# Patient Record
Sex: MALE | Race: WHITE | NOT HISPANIC OR LATINO | URBAN - METROPOLITAN AREA
[De-identification: names, ages, dates, MRNs, and addresses within clinical notes are randomized per-mention and may not be internally consistent; named-entity substitution may affect disease eponyms.]

---

## 2023-02-24 LAB
ALANINE AMINOTRANSFERASE (SGPT) (U/L) IN SER/PLAS: 28 U/L (ref 10–52)
ALBUMIN (G/DL) IN SER/PLAS: 4.6 G/DL (ref 3.4–5)
ALKALINE PHOSPHATASE (U/L) IN SER/PLAS: 44 U/L (ref 33–120)
ANION GAP IN SER/PLAS: 14 MMOL/L (ref 10–20)
ASPARTATE AMINOTRANSFERASE (SGOT) (U/L) IN SER/PLAS: 29 U/L (ref 9–39)
BILIRUBIN TOTAL (MG/DL) IN SER/PLAS: 1.1 MG/DL (ref 0–1.2)
CALCIUM (MG/DL) IN SER/PLAS: 10.4 MG/DL (ref 8.6–10.3)
CARBON DIOXIDE, TOTAL (MMOL/L) IN SER/PLAS: 29 MMOL/L (ref 21–32)
CHLORIDE (MMOL/L) IN SER/PLAS: 100 MMOL/L (ref 98–107)
CHOLESTEROL (MG/DL) IN SER/PLAS: 167 MG/DL (ref 0–199)
CHOLESTEROL IN HDL (MG/DL) IN SER/PLAS: 46.8 MG/DL
CHOLESTEROL/HDL RATIO: 3.6
CREATININE (MG/DL) IN SER/PLAS: 1.04 MG/DL (ref 0.5–1.3)
ERYTHROCYTE DISTRIBUTION WIDTH (RATIO) BY AUTOMATED COUNT: 12.2 % (ref 11.5–14.5)
ERYTHROCYTE MEAN CORPUSCULAR HEMOGLOBIN CONCENTRATION (G/DL) BY AUTOMATED: 34.7 G/DL (ref 32–36)
ERYTHROCYTE MEAN CORPUSCULAR VOLUME (FL) BY AUTOMATED COUNT: 91 FL (ref 80–100)
ERYTHROCYTES (10*6/UL) IN BLOOD BY AUTOMATED COUNT: 5.21 X10E12/L (ref 4.5–5.9)
GFR MALE: 83 ML/MIN/1.73M2
GLUCOSE (MG/DL) IN SER/PLAS: 87 MG/DL (ref 74–99)
HEMATOCRIT (%) IN BLOOD BY AUTOMATED COUNT: 47.3 % (ref 41–52)
HEMOGLOBIN (G/DL) IN BLOOD: 16.4 G/DL (ref 13.5–17.5)
LDL: 91 MG/DL (ref 0–99)
LEUKOCYTES (10*3/UL) IN BLOOD BY AUTOMATED COUNT: 9.8 X10E9/L (ref 4.4–11.3)
PLATELETS (10*3/UL) IN BLOOD AUTOMATED COUNT: 334 X10E9/L (ref 150–450)
POTASSIUM (MMOL/L) IN SER/PLAS: 3.8 MMOL/L (ref 3.5–5.3)
PROSTATE SPECIFIC AG (NG/ML) IN SER/PLAS: 0.71 NG/ML (ref 0–4)
PROTEIN TOTAL: 7.8 G/DL (ref 6.4–8.2)
SODIUM (MMOL/L) IN SER/PLAS: 139 MMOL/L (ref 136–145)
TRIGLYCERIDE (MG/DL) IN SER/PLAS: 144 MG/DL (ref 0–149)
UREA NITROGEN (MG/DL) IN SER/PLAS: 27 MG/DL (ref 6–23)
VLDL: 29 MG/DL (ref 0–40)

## 2023-04-14 DIAGNOSIS — I10 HYPERTENSION, BENIGN: ICD-10-CM

## 2023-04-14 RX ORDER — LISINOPRIL 20 MG/1
20 TABLET ORAL DAILY
Qty: 90 TABLET | Refills: 1 | Status: SHIPPED | OUTPATIENT
Start: 2023-04-14 | End: 2023-10-11

## 2023-04-14 RX ORDER — LISINOPRIL 20 MG/1
1 TABLET ORAL DAILY
COMMUNITY
End: 2023-04-14 | Stop reason: SDUPTHER

## 2023-04-28 DIAGNOSIS — I10 HYPERTENSION, BENIGN: Primary | ICD-10-CM

## 2023-04-28 DIAGNOSIS — F32.A DEPRESSION, UNSPECIFIED DEPRESSION TYPE: ICD-10-CM

## 2023-04-28 DIAGNOSIS — E78.5 HYPERLIPIDEMIA, UNSPECIFIED HYPERLIPIDEMIA TYPE: ICD-10-CM

## 2023-04-28 RX ORDER — CITALOPRAM 20 MG/1
1 TABLET, FILM COATED ORAL DAILY
COMMUNITY
Start: 2019-08-22 | End: 2023-04-28 | Stop reason: SDUPTHER

## 2023-04-28 RX ORDER — SIMVASTATIN 20 MG/1
20 TABLET, FILM COATED ORAL DAILY
Qty: 90 TABLET | Refills: 0 | Status: SHIPPED | OUTPATIENT
Start: 2023-04-28 | End: 2023-07-06

## 2023-04-28 RX ORDER — CITALOPRAM 20 MG/1
20 TABLET, FILM COATED ORAL DAILY
Qty: 90 TABLET | Refills: 0 | Status: SHIPPED | OUTPATIENT
Start: 2023-04-28 | End: 2023-07-06

## 2023-04-28 RX ORDER — SIMVASTATIN 20 MG/1
1 TABLET, FILM COATED ORAL DAILY
COMMUNITY
Start: 2021-07-11 | End: 2023-04-28 | Stop reason: SDUPTHER

## 2023-04-28 NOTE — TELEPHONE ENCOUNTER
Pt needs refill for simvastatin and citalopram - both 20mg - pt stated [facility] [place] stated they were on hold and needs a call from the office to fill. Please call and advise.

## 2023-05-24 PROBLEM — M19.90 ARTHRITIS: Status: ACTIVE | Noted: 2023-05-24

## 2023-05-24 PROBLEM — E78.5 HYPERLIPIDEMIA: Status: ACTIVE | Noted: 2023-05-24

## 2023-05-24 PROBLEM — I77.819 AORTIC DILATATION (CMS-HCC): Status: ACTIVE | Noted: 2023-05-24

## 2023-05-24 PROBLEM — M22.2X1 PATELLOFEMORAL SYNDROME, RIGHT: Status: ACTIVE | Noted: 2023-05-24

## 2023-05-24 PROBLEM — M67.51 PLICA SYNDROME, RIGHT KNEE: Status: ACTIVE | Noted: 2023-05-24

## 2023-05-24 PROBLEM — F32.A DEPRESSION: Status: ACTIVE | Noted: 2023-05-24

## 2023-05-24 PROBLEM — M25.561 RIGHT KNEE PAIN: Status: ACTIVE | Noted: 2023-05-24

## 2023-05-24 PROBLEM — M25.461 EFFUSION OF RIGHT KNEE: Status: ACTIVE | Noted: 2023-05-24

## 2023-06-02 ENCOUNTER — OFFICE VISIT (OUTPATIENT)
Dept: PRIMARY CARE | Facility: CLINIC | Age: 58
End: 2023-06-02
Payer: COMMERCIAL

## 2023-06-02 ENCOUNTER — LAB (OUTPATIENT)
Dept: LAB | Facility: LAB | Age: 58
End: 2023-06-02
Payer: COMMERCIAL

## 2023-06-02 VITALS
HEIGHT: 68 IN | WEIGHT: 242 LBS | DIASTOLIC BLOOD PRESSURE: 78 MMHG | SYSTOLIC BLOOD PRESSURE: 119 MMHG | HEART RATE: 86 BPM | BODY MASS INDEX: 36.68 KG/M2 | OXYGEN SATURATION: 96 % | TEMPERATURE: 97.5 F

## 2023-06-02 DIAGNOSIS — R06.83 LOUD SNORING: ICD-10-CM

## 2023-06-02 DIAGNOSIS — R53.83 FATIGUE, UNSPECIFIED TYPE: Primary | ICD-10-CM

## 2023-06-02 DIAGNOSIS — R53.83 FATIGUE, UNSPECIFIED TYPE: ICD-10-CM

## 2023-06-02 LAB — THYROTROPIN (MIU/L) IN SER/PLAS BY DETECTION LIMIT <= 0.05 MIU/L: 2.26 MIU/L (ref 0.44–3.98)

## 2023-06-02 PROCEDURE — 36415 COLL VENOUS BLD VENIPUNCTURE: CPT

## 2023-06-02 PROCEDURE — 84443 ASSAY THYROID STIM HORMONE: CPT

## 2023-06-02 PROCEDURE — 1036F TOBACCO NON-USER: CPT | Performed by: STUDENT IN AN ORGANIZED HEALTH CARE EDUCATION/TRAINING PROGRAM

## 2023-06-02 PROCEDURE — 3078F DIAST BP <80 MM HG: CPT | Performed by: STUDENT IN AN ORGANIZED HEALTH CARE EDUCATION/TRAINING PROGRAM

## 2023-06-02 PROCEDURE — 99213 OFFICE O/P EST LOW 20 MIN: CPT | Performed by: STUDENT IN AN ORGANIZED HEALTH CARE EDUCATION/TRAINING PROGRAM

## 2023-06-02 PROCEDURE — 3008F BODY MASS INDEX DOCD: CPT | Performed by: STUDENT IN AN ORGANIZED HEALTH CARE EDUCATION/TRAINING PROGRAM

## 2023-06-02 PROCEDURE — 3074F SYST BP LT 130 MM HG: CPT | Performed by: STUDENT IN AN ORGANIZED HEALTH CARE EDUCATION/TRAINING PROGRAM

## 2023-06-02 RX ORDER — HYDROCHLOROTHIAZIDE 25 MG/1
12.5 TABLET ORAL DAILY
COMMUNITY
Start: 2021-12-13 | End: 2023-10-20 | Stop reason: SDUPTHER

## 2023-06-02 RX ORDER — ASPIRIN 81 MG/1
81 TABLET ORAL
COMMUNITY

## 2023-06-02 ASSESSMENT — ENCOUNTER SYMPTOMS
FEVER: 0
SHORTNESS OF BREATH: 0
DIAPHORESIS: 0
NAUSEA: 0
CHEST TIGHTNESS: 0
VOMITING: 0
PALPITATIONS: 0
DYSURIA: 0
DIZZINESS: 0
CHILLS: 0
LIGHT-HEADEDNESS: 0
FATIGUE: 1

## 2023-06-02 ASSESSMENT — PATIENT HEALTH QUESTIONNAIRE - PHQ9
1. LITTLE INTEREST OR PLEASURE IN DOING THINGS: NOT AT ALL
SUM OF ALL RESPONSES TO PHQ9 QUESTIONS 1 AND 2: 0
2. FEELING DOWN, DEPRESSED OR HOPELESS: NOT AT ALL

## 2023-06-02 NOTE — ASSESSMENT & PLAN NOTE
Routine labs reviewed from February.  Given the fatigue, elevated BMI, loud snoring, will order home sleep apnea testing.  Also ordered TSH.  Presence of morning erections is reassuring of testosterone level.  If all work-up is negative could consider checking testosterone though would not suspect this to be decreased.  Follow-up with Dr. Randle for CPE as previously scheduled or sooner if fatigue persists.

## 2023-06-02 NOTE — PROGRESS NOTES
"Subjective   Patient ID: Venkatesh Almodovar is a 58 y.o. male who presents for Sleep Apnea.  He is here for concern of sleep apnea. Reports more daytime fatigue and taking naps in afternoon for 15-20 minutes for last few months. Feels fatigued today. Wife notices that he is snoring more prominently than usual. Wife hasn't mentioned apnea episodes that she's noticed. Still getting morning erections. Fatigue not worse with activity. No chest pain or SOB going up 2 flights of stairs.         Review of Systems   Constitutional:  Positive for fatigue. Negative for chills, diaphoresis and fever.   HENT:  Negative for hearing loss.    Eyes:  Negative for visual disturbance.   Respiratory:  Negative for chest tightness and shortness of breath.    Cardiovascular:  Negative for chest pain, palpitations and leg swelling.   Gastrointestinal:  Negative for nausea and vomiting.   Endocrine: Negative for cold intolerance and heat intolerance.   Genitourinary:  Negative for dysuria.   Skin:  Negative for rash.   Neurological:  Negative for dizziness and light-headedness.       /78   Pulse 86   Temp 36.4 °C (97.5 °F)   Ht 1.727 m (5' 8\")   Wt 110 kg (242 lb)   SpO2 96%   BMI 36.80 kg/m²     Objective   Physical Exam  Vitals reviewed.   Constitutional:       General: He is not in acute distress.     Appearance: Normal appearance.   HENT:      Head: Normocephalic.   Cardiovascular:      Rate and Rhythm: Normal rate and regular rhythm.   Pulmonary:      Effort: Pulmonary effort is normal. No respiratory distress.      Breath sounds: Normal breath sounds.   Abdominal:      General: There is no distension.   Musculoskeletal:         General: No swelling or deformity.   Skin:     Coloration: Skin is not jaundiced.   Neurological:      Mental Status: He is alert.         Assessment/Plan   Problem List Items Addressed This Visit          Respiratory    Loud snoring    Relevant Orders    Home sleep apnea test (HSAT)       " Endocrine/Metabolic    BMI 36.0-36.9,adult    Relevant Orders    Home sleep apnea test (HSAT)       Other    Fatigue - Primary     Routine labs reviewed from February.  Given the fatigue, elevated BMI, loud snoring, will order home sleep apnea testing.  Also ordered TSH.  Presence of morning erections is reassuring of testosterone level.  If all work-up is negative could consider checking testosterone though would not suspect this to be decreased.  Follow-up with Dr. Randle for CPE as previously scheduled or sooner if fatigue persists.         Relevant Orders    TSH with reflex to Free T4 if abnormal    Home sleep apnea test (HSAT)

## 2023-06-05 ENCOUNTER — TELEPHONE (OUTPATIENT)
Dept: PRIMARY CARE | Facility: CLINIC | Age: 58
End: 2023-06-05
Payer: COMMERCIAL

## 2023-06-05 NOTE — TELEPHONE ENCOUNTER
----- Message from Parminder Ny DO sent at 6/4/2023  6:51 PM EDT -----  Please let him know his thyroid testing was normal.

## 2023-06-08 ENCOUNTER — TELEPHONE (OUTPATIENT)
Dept: PRIMARY CARE | Facility: CLINIC | Age: 58
End: 2023-06-08
Payer: COMMERCIAL

## 2023-06-08 DIAGNOSIS — R06.83 LOUD SNORING: ICD-10-CM

## 2023-06-08 NOTE — TELEPHONE ENCOUNTER
----- Message from Parminder Ny DO sent at 6/8/2023  8:19 AM EDT -----  Please let him know his insurance doesn't cover the home sleep test and he needs to go in person for it. They will call him to schedule it.

## 2023-07-06 DIAGNOSIS — E78.5 HYPERLIPIDEMIA, UNSPECIFIED HYPERLIPIDEMIA TYPE: ICD-10-CM

## 2023-07-06 DIAGNOSIS — F32.A DEPRESSION, UNSPECIFIED DEPRESSION TYPE: ICD-10-CM

## 2023-07-06 RX ORDER — SIMVASTATIN 20 MG/1
TABLET, FILM COATED ORAL
Qty: 90 TABLET | Refills: 0 | Status: SHIPPED | OUTPATIENT
Start: 2023-07-06 | End: 2023-10-11

## 2023-07-06 RX ORDER — CITALOPRAM 20 MG/1
TABLET, FILM COATED ORAL
Qty: 90 TABLET | Refills: 0 | Status: SHIPPED | OUTPATIENT
Start: 2023-07-06 | End: 2023-10-11

## 2023-10-11 DIAGNOSIS — E78.5 HYPERLIPIDEMIA, UNSPECIFIED HYPERLIPIDEMIA TYPE: ICD-10-CM

## 2023-10-11 DIAGNOSIS — I10 HYPERTENSION, BENIGN: ICD-10-CM

## 2023-10-11 DIAGNOSIS — F32.A DEPRESSION, UNSPECIFIED DEPRESSION TYPE: ICD-10-CM

## 2023-10-11 RX ORDER — CITALOPRAM 20 MG/1
TABLET, FILM COATED ORAL
Qty: 90 TABLET | Refills: 1 | Status: SHIPPED | OUTPATIENT
Start: 2023-10-11 | End: 2023-10-20 | Stop reason: SDUPTHER

## 2023-10-11 RX ORDER — LISINOPRIL 20 MG/1
20 TABLET ORAL DAILY
Qty: 90 TABLET | Refills: 1 | Status: SHIPPED | OUTPATIENT
Start: 2023-10-11 | End: 2023-10-20 | Stop reason: SDUPTHER

## 2023-10-11 RX ORDER — SIMVASTATIN 20 MG/1
20 TABLET, FILM COATED ORAL DAILY
Qty: 90 TABLET | Refills: 1 | Status: SHIPPED | OUTPATIENT
Start: 2023-10-11 | End: 2023-10-20 | Stop reason: SDUPTHER

## 2023-10-20 DIAGNOSIS — I10 HYPERTENSION, BENIGN: ICD-10-CM

## 2023-10-20 DIAGNOSIS — F32.A DEPRESSION, UNSPECIFIED DEPRESSION TYPE: ICD-10-CM

## 2023-10-20 DIAGNOSIS — E78.5 HYPERLIPIDEMIA, UNSPECIFIED HYPERLIPIDEMIA TYPE: ICD-10-CM

## 2023-10-20 RX ORDER — HYDROCHLOROTHIAZIDE 25 MG/1
12.5 TABLET ORAL DAILY
Qty: 90 TABLET | Refills: 1 | Status: SHIPPED | OUTPATIENT
Start: 2023-10-20 | End: 2024-03-05 | Stop reason: DRUGHIGH

## 2023-10-20 RX ORDER — CITALOPRAM 20 MG/1
20 TABLET, FILM COATED ORAL DAILY
Qty: 90 TABLET | Refills: 1 | Status: SHIPPED | OUTPATIENT
Start: 2023-10-20 | End: 2024-04-08

## 2023-10-20 RX ORDER — LISINOPRIL 20 MG/1
20 TABLET ORAL DAILY
Qty: 90 TABLET | Refills: 1 | Status: SHIPPED | OUTPATIENT
Start: 2023-10-20 | End: 2024-03-01 | Stop reason: SDUPTHER

## 2023-10-20 RX ORDER — SIMVASTATIN 20 MG/1
20 TABLET, FILM COATED ORAL DAILY
Qty: 90 TABLET | Refills: 1 | Status: SHIPPED | OUTPATIENT
Start: 2023-10-20 | End: 2024-04-08

## 2023-10-20 RX ORDER — LISINOPRIL 20 MG/1
20 TABLET ORAL DAILY
COMMUNITY
End: 2023-10-20 | Stop reason: ALTCHOICE

## 2024-03-01 ENCOUNTER — LAB (OUTPATIENT)
Dept: LAB | Facility: LAB | Age: 59
End: 2024-03-01
Payer: COMMERCIAL

## 2024-03-01 ENCOUNTER — OFFICE VISIT (OUTPATIENT)
Dept: PRIMARY CARE | Facility: CLINIC | Age: 59
End: 2024-03-01
Payer: COMMERCIAL

## 2024-03-01 VITALS
DIASTOLIC BLOOD PRESSURE: 78 MMHG | WEIGHT: 246.2 LBS | RESPIRATION RATE: 16 BRPM | OXYGEN SATURATION: 94 % | SYSTOLIC BLOOD PRESSURE: 118 MMHG | TEMPERATURE: 97.8 F | HEIGHT: 68 IN | HEART RATE: 86 BPM | BODY MASS INDEX: 37.31 KG/M2

## 2024-03-01 DIAGNOSIS — E66.01 SEVERE OBESITY (BMI 35.0-35.9 WITH COMORBIDITY) (MULTI): ICD-10-CM

## 2024-03-01 DIAGNOSIS — I10 HYPERTENSION, BENIGN: ICD-10-CM

## 2024-03-01 DIAGNOSIS — Z12.5 SCREENING FOR PROSTATE CANCER: ICD-10-CM

## 2024-03-01 DIAGNOSIS — Z00.00 HEALTH MAINTENANCE EXAMINATION: ICD-10-CM

## 2024-03-01 DIAGNOSIS — Z00.00 HEALTH MAINTENANCE EXAMINATION: Primary | ICD-10-CM

## 2024-03-01 LAB
ALBUMIN SERPL BCP-MCNC: 4.4 G/DL (ref 3.4–5)
ALP SERPL-CCNC: 40 U/L (ref 33–136)
ALT SERPL W P-5'-P-CCNC: 23 U/L (ref 10–52)
ANION GAP SERPL CALC-SCNC: 14 MMOL/L (ref 10–20)
AST SERPL W P-5'-P-CCNC: 22 U/L (ref 9–39)
BILIRUB SERPL-MCNC: 0.7 MG/DL (ref 0–1.2)
BUN SERPL-MCNC: 18 MG/DL (ref 6–23)
CALCIUM SERPL-MCNC: 10 MG/DL (ref 8.6–10.3)
CHLORIDE SERPL-SCNC: 102 MMOL/L (ref 98–107)
CHOLEST SERPL-MCNC: 168 MG/DL (ref 0–199)
CHOLESTEROL/HDL RATIO: 3.2
CO2 SERPL-SCNC: 31 MMOL/L (ref 21–32)
CREAT SERPL-MCNC: 1.02 MG/DL (ref 0.5–1.3)
EGFRCR SERPLBLD CKD-EPI 2021: 84 ML/MIN/1.73M*2
ERYTHROCYTE [DISTWIDTH] IN BLOOD BY AUTOMATED COUNT: 12.2 % (ref 11.5–14.5)
GLUCOSE SERPL-MCNC: 100 MG/DL (ref 74–99)
HCT VFR BLD AUTO: 47.3 % (ref 41–52)
HDLC SERPL-MCNC: 52.1 MG/DL
HGB BLD-MCNC: 16.3 G/DL (ref 13.5–17.5)
LDLC SERPL CALC-MCNC: 92 MG/DL
MCH RBC QN AUTO: 31.3 PG (ref 26–34)
MCHC RBC AUTO-ENTMCNC: 34.5 G/DL (ref 32–36)
MCV RBC AUTO: 91 FL (ref 80–100)
NON HDL CHOLESTEROL: 116 MG/DL (ref 0–149)
NRBC BLD-RTO: 0 /100 WBCS (ref 0–0)
PLATELET # BLD AUTO: 323 X10*3/UL (ref 150–450)
POTASSIUM SERPL-SCNC: 4.1 MMOL/L (ref 3.5–5.3)
PROT SERPL-MCNC: 7.1 G/DL (ref 6.4–8.2)
PSA SERPL-MCNC: 0.62 NG/ML
RBC # BLD AUTO: 5.2 X10*6/UL (ref 4.5–5.9)
SODIUM SERPL-SCNC: 143 MMOL/L (ref 136–145)
TRIGL SERPL-MCNC: 121 MG/DL (ref 0–149)
VLDL: 24 MG/DL (ref 0–40)
WBC # BLD AUTO: 7.9 X10*3/UL (ref 4.4–11.3)

## 2024-03-01 PROCEDURE — 80053 COMPREHEN METABOLIC PANEL: CPT

## 2024-03-01 PROCEDURE — 84153 ASSAY OF PSA TOTAL: CPT

## 2024-03-01 PROCEDURE — 80061 LIPID PANEL: CPT

## 2024-03-01 PROCEDURE — 85027 COMPLETE CBC AUTOMATED: CPT

## 2024-03-01 PROCEDURE — 3078F DIAST BP <80 MM HG: CPT | Performed by: INTERNAL MEDICINE

## 2024-03-01 PROCEDURE — 36415 COLL VENOUS BLD VENIPUNCTURE: CPT

## 2024-03-01 PROCEDURE — 1036F TOBACCO NON-USER: CPT | Performed by: INTERNAL MEDICINE

## 2024-03-01 PROCEDURE — 3074F SYST BP LT 130 MM HG: CPT | Performed by: INTERNAL MEDICINE

## 2024-03-01 PROCEDURE — 99396 PREV VISIT EST AGE 40-64: CPT | Performed by: INTERNAL MEDICINE

## 2024-03-01 PROCEDURE — 3008F BODY MASS INDEX DOCD: CPT | Performed by: INTERNAL MEDICINE

## 2024-03-01 RX ORDER — LISINOPRIL 20 MG/1
20 TABLET ORAL DAILY
Qty: 90 TABLET | Refills: 1 | Status: CANCELLED | OUTPATIENT
Start: 2024-03-01

## 2024-03-01 RX ORDER — LISINOPRIL 20 MG/1
20 TABLET ORAL DAILY
Qty: 90 TABLET | Refills: 1 | Status: SHIPPED | OUTPATIENT
Start: 2024-03-01 | End: 2024-04-08

## 2024-03-01 ASSESSMENT — ENCOUNTER SYMPTOMS
CHILLS: 0
WOUND: 0
VOMITING: 0
RHINORRHEA: 0
ARTHRALGIAS: 0
CONSTIPATION: 0
HEADACHES: 0
DYSURIA: 0
HEMATURIA: 0
POLYDIPSIA: 0
SHORTNESS OF BREATH: 0
DYSPHORIC MOOD: 0
NERVOUS/ANXIOUS: 0
DIARRHEA: 0
POLYPHAGIA: 0
COUGH: 0
NAUSEA: 0
WHEEZING: 0
MYALGIAS: 0
SORE THROAT: 0
FREQUENCY: 0
EYE PAIN: 0
BLOOD IN STOOL: 0
UNEXPECTED WEIGHT CHANGE: 0
CHEST TIGHTNESS: 0
DIZZINESS: 0
ABDOMINAL PAIN: 0
FEVER: 0
PALPITATIONS: 0

## 2024-03-01 ASSESSMENT — PATIENT HEALTH QUESTIONNAIRE - PHQ9
SUM OF ALL RESPONSES TO PHQ9 QUESTIONS 1 AND 2: 0
2. FEELING DOWN, DEPRESSED OR HOPELESS: NOT AT ALL
1. LITTLE INTEREST OR PLEASURE IN DOING THINGS: NOT AT ALL

## 2024-03-01 NOTE — PROGRESS NOTES
"Subjective   Patient ID: Venkatesh Almodovar is a 59 y.o. male who presents for Annual Exam and Labs Only (Pt is fasting).    HPI     Dental visits-  UTD  Eye visits- glasses, UTD    Exercise-goes periodically, not regular to the gym (2 hours per week)  Diet-not good    Alcohol use-1 beer per day, 2 on weekends  Smoking-none    States not get at portion control  Sees Dr. Brizuela soon    Review of Systems   Constitutional:  Negative for chills, fever and unexpected weight change.   HENT:  Negative for congestion, hearing loss, rhinorrhea and sore throat.    Eyes:  Negative for pain and visual disturbance.   Respiratory:  Negative for cough, chest tightness, shortness of breath and wheezing.    Cardiovascular:  Negative for chest pain and palpitations.   Gastrointestinal:  Negative for abdominal pain, blood in stool, constipation, diarrhea, nausea and vomiting.   Endocrine: Negative for cold intolerance, heat intolerance, polydipsia and polyphagia.   Genitourinary:  Negative for dysuria, frequency and hematuria.   Musculoskeletal:  Negative for arthralgias and myalgias.   Skin:  Negative for rash and wound.   Neurological:  Negative for dizziness, syncope and headaches.   Psychiatric/Behavioral:  Negative for dysphoric mood. The patient is not nervous/anxious.        Objective   /90   Pulse 86   Temp 36.6 °C (97.8 °F)   Resp 16   Ht 1.715 m (5' 7.5\")   Wt 112 kg (246 lb 3.2 oz)   SpO2 94%   BMI 37.99 kg/m²     Physical Exam  Vitals reviewed.   Constitutional:       Appearance: Normal appearance. He is not ill-appearing.   HENT:      Head: Normocephalic and atraumatic.      Right Ear: Tympanic membrane normal.      Left Ear: Tympanic membrane normal.      Nose: Nose normal.      Mouth/Throat:      Mouth: Mucous membranes are moist.      Pharynx: Oropharynx is clear.   Eyes:      Extraocular Movements: Extraocular movements intact.      Conjunctiva/sclera: Conjunctivae normal.      Pupils: Pupils are equal, " round, and reactive to light.   Cardiovascular:      Rate and Rhythm: Normal rate and regular rhythm.   Pulmonary:      Effort: Pulmonary effort is normal.      Breath sounds: Normal breath sounds. No wheezing.   Abdominal:      General: There is no distension.      Palpations: Abdomen is soft. There is no mass.      Tenderness: There is no abdominal tenderness.   Musculoskeletal:         General: No swelling.      Cervical back: Neck supple.   Lymphadenopathy:      Cervical: No cervical adenopathy.   Neurological:      General: No focal deficit present.      Mental Status: He is alert and oriented to person, place, and time.      Gait: Gait normal.   Psychiatric:         Mood and Affect: Mood normal.         Behavior: Behavior normal.         Thought Content: Thought content normal.         Assessment/Plan   Problem List Items Addressed This Visit             ICD-10-CM    Hypertension, benign I10     Other Visit Diagnoses         Codes    Health maintenance examination    -  Primary Z00.00    Relevant Orders    Comprehensive Metabolic Panel    Lipid Panel    CBC    Screening for prostate cancer     Z12.5    Relevant Orders    Prostate Specific Antigen             CPE completed.  Advised to keep a heart healthy, low fat diet with fruits and veggies like Mediterranean diet.  Advised on the importance of exercise and maintaining 150 minutes of exercise per week (30 minutes per day 5 days a week).  Advised on regular eye and dental visits.  Discussed age appropriate cancer screening, immunizations and recommendations given.  Discussed avoiding illicit drugs and tobacco. Advised on appropriate use of alcohol.  Advised to wear seat belt.     Aortic dilation: seeing Dr. Brizuela     Liver cyst: follow 1 year--he will call if does not get scan with Dr. Brizuela where liver lesion is usually seen. If not done this year, check ultrasound     Hypertension: controlled. Controlled. Compliant with medications. Tolerating  medications without issues. Continue with current plan. Continue lisinopril and hctz     Anxiety: on celexa. Controlled. Compliant with medications. Tolerating medications without issues. Continue with current plan.     Hyperlipidemia: on simvastatin. Controlled. Compliant with medications. Tolerating medications without issues. Continue with current plan.  calcium score: 2 ()     Right knee pain with slight effusion: , saw ortho and sent to PT     Check labs, CPE 1 year. Followup 6 months    Health Maintenance  -Prostate Cancer Screenin/23 WNL, recheck  -Colonoscopy: 3/2021- 10 years  -Vaccinations: Tdap 20. UTD with shingrix (in [place]-will try and get) , covid, and flu

## 2024-03-05 ENCOUNTER — OFFICE VISIT (OUTPATIENT)
Dept: CARDIOLOGY | Facility: CLINIC | Age: 59
End: 2024-03-05
Payer: COMMERCIAL

## 2024-03-05 VITALS
BODY MASS INDEX: 37.28 KG/M2 | HEART RATE: 87 BPM | DIASTOLIC BLOOD PRESSURE: 79 MMHG | HEIGHT: 68 IN | SYSTOLIC BLOOD PRESSURE: 124 MMHG | WEIGHT: 246 LBS | TEMPERATURE: 97.7 F | RESPIRATION RATE: 16 BRPM

## 2024-03-05 DIAGNOSIS — K76.89 HEPATIC CYST: ICD-10-CM

## 2024-03-05 DIAGNOSIS — E78.5 HYPERLIPIDEMIA, UNSPECIFIED HYPERLIPIDEMIA TYPE: Primary | ICD-10-CM

## 2024-03-05 DIAGNOSIS — I77.819 AORTIC DILATATION (CMS-HCC): ICD-10-CM

## 2024-03-05 DIAGNOSIS — I10 HYPERTENSION, BENIGN: ICD-10-CM

## 2024-03-05 PROCEDURE — 93010 ELECTROCARDIOGRAM REPORT: CPT | Performed by: INTERNAL MEDICINE

## 2024-03-05 PROCEDURE — 3078F DIAST BP <80 MM HG: CPT | Performed by: INTERNAL MEDICINE

## 2024-03-05 PROCEDURE — 99214 OFFICE O/P EST MOD 30 MIN: CPT | Performed by: INTERNAL MEDICINE

## 2024-03-05 PROCEDURE — 93005 ELECTROCARDIOGRAM TRACING: CPT | Performed by: INTERNAL MEDICINE

## 2024-03-05 PROCEDURE — 3008F BODY MASS INDEX DOCD: CPT | Performed by: INTERNAL MEDICINE

## 2024-03-05 PROCEDURE — 3074F SYST BP LT 130 MM HG: CPT | Performed by: INTERNAL MEDICINE

## 2024-03-05 PROCEDURE — 1036F TOBACCO NON-USER: CPT | Performed by: INTERNAL MEDICINE

## 2024-03-05 RX ORDER — HYDROCHLOROTHIAZIDE 25 MG/1
25 TABLET ORAL DAILY
Qty: 90 TABLET | Refills: 3 | Status: SHIPPED | OUTPATIENT
Start: 2024-03-05 | End: 2025-03-05

## 2024-03-05 NOTE — PROGRESS NOTES
Chief Complaint:   HTN, DLD and mild ascending aortic dilation.      History Of Present Illness:    Venkatesh Almodovar is a 59 y.o. male presenting to the Cardiology clinic as a follow up visit. ECG today NSR. Patient reports gaining 7lbs this year, has not been exercising or dieting. Patient is aware and motivated to lose weight. Does not check BP at home but has been at goal with other clinic visits. Denies any sob, cp, palpitations, dizziness, and leg swelling/pain.      Last Recorded Vitals:  There were no vitals filed for this visit.    Past Medical History:  He has a past medical history of Personal history of other endocrine, nutritional and metabolic disease (04/15/2019) and Personal history of other mental and behavioral disorders.    Past Surgical History:  He has a past surgical history that includes Other surgical history (04/15/2019).      Social History:  He reports that he has never smoked. He has never been exposed to tobacco smoke. He has never used smokeless tobacco. He reports current alcohol use. He reports that he does not use drugs.    Family History:  No family history on file.     Allergies:  Patient has no known allergies.    Outpatient Medications:  Current Outpatient Medications   Medication Instructions    aspirin 81 mg, oral    citalopram (CELEXA) 20 mg, oral, Daily    hydroCHLOROthiazide (HYDRODIURIL) 12.5 mg, oral, Daily    lisinopril 20 mg, oral, Daily    simvastatin (ZOCOR) 20 mg, oral, Daily       Physical Exam:  Constitutional: alert and in no acute distress.   Neck: neck is supple, symmetric, trachea midline, no masses .   Pulmonary: no increased work of breathing or signs of respiratory distress  and lungs clear to auscultation.    Cardiovascular: regular rhythm, normal S1 and S2, no murmurs  and no edema .   Psychiatric judgment and insight is normal , oriented to person, place and time  and normal mood and affect .      Last Labs:  CBC -  Lab Results   Component Value Date    WBC  "7.9 03/01/2024    HGB 16.3 03/01/2024    HCT 47.3 03/01/2024    MCV 91 03/01/2024     03/01/2024       CMP -  Lab Results   Component Value Date    CALCIUM 10.0 03/01/2024    PHOS 2.6 01/31/2022    PROT 7.1 03/01/2024    ALBUMIN 4.4 03/01/2024    AST 22 03/01/2024    ALT 23 03/01/2024    ALKPHOS 40 03/01/2024    BILITOT 0.7 03/01/2024       LIPID PANEL -   Lab Results   Component Value Date    CHOL 168 03/01/2024    TRIG 121 03/01/2024    HDL 52.1 03/01/2024    CHHDL 3.2 03/01/2024    LDLF 91 02/24/2023    VLDL 24 03/01/2024    NHDL 116 03/01/2024       RENAL FUNCTION PANEL -   Lab Results   Component Value Date    GLUCOSE 100 (H) 03/01/2024     03/01/2024    K 4.1 03/01/2024     03/01/2024    CO2 31 03/01/2024    ANIONGAP 14 03/01/2024    BUN 18 03/01/2024    CREATININE 1.02 03/01/2024    GFRMALE 83 02/24/2023    CALCIUM 10.0 03/01/2024    PHOS 2.6 01/31/2022    ALBUMIN 4.4 03/01/2024        No results found for: \"BNP\", \"HGBA1C\"    Assessment/Plan   60 yo M with hx of HTN and DLD with mild ascending aortic dilatation 3.8 cm found incidentally on CAC and stable on CTA chest 3.7 cm in 2022. Patient due for repeating imaging. BP well-controlled on lisinopril 20 mg daily and HCTZ 25 mg daily. LDL 92 chol 168 3/1/2024 on simvastatin 20 mg daily.     Plan:   - Will place order for CT angio to monitor asc aortic dilation, PCP would like to monitor for liver cyst as well.  - BP at goal with current regimen. No changes made at this visit.   - Encouraged diet and exercise patient motivated and agreeable to work on that.  - Previously discussed with patient that he could stop primary prevention ASA, but he wishes to continue.   - RTC in 1 year    Lewis Dunaway MD  "

## 2024-03-06 LAB
ATRIAL RATE: 79 BPM
P AXIS: 46 DEGREES
P OFFSET: 188 MS
P ONSET: 138 MS
PR INTERVAL: 170 MS
Q ONSET: 223 MS
QRS COUNT: 13 BEATS
QRS DURATION: 72 MS
QT INTERVAL: 372 MS
QTC CALCULATION(BAZETT): 426 MS
QTC FREDERICIA: 407 MS
R AXIS: -21 DEGREES
T AXIS: 15 DEGREES
T OFFSET: 409 MS
VENTRICULAR RATE: 79 BPM

## 2024-03-25 ENCOUNTER — HOSPITAL ENCOUNTER (OUTPATIENT)
Dept: RADIOLOGY | Facility: CLINIC | Age: 59
Discharge: HOME | End: 2024-03-25
Payer: COMMERCIAL

## 2024-03-25 DIAGNOSIS — I77.819 AORTIC DILATATION (CMS-HCC): ICD-10-CM

## 2024-03-25 DIAGNOSIS — K76.89 HEPATIC CYST: ICD-10-CM

## 2024-03-25 DIAGNOSIS — I10 HYPERTENSION, BENIGN: ICD-10-CM

## 2024-03-25 PROCEDURE — 71275 CT ANGIOGRAPHY CHEST: CPT

## 2024-03-25 PROCEDURE — 2550000001 HC RX 255 CONTRASTS: Performed by: INTERNAL MEDICINE

## 2024-03-25 PROCEDURE — 71275 CT ANGIOGRAPHY CHEST: CPT | Performed by: INTERNAL MEDICINE

## 2024-03-25 RX ADMIN — IOHEXOL 75 ML: 350 INJECTION, SOLUTION INTRAVENOUS at 09:49

## 2024-03-27 ENCOUNTER — TELEPHONE (OUTPATIENT)
Dept: CARDIOLOGY | Facility: CLINIC | Age: 59
End: 2024-03-27
Payer: COMMERCIAL

## 2024-03-27 NOTE — TELEPHONE ENCOUNTER
Result Communication    Resulted Orders   CT angio chest w and wo IV contrast    Narrative    Interpreted By:  Alice Nevarez,  and Сергей Serrano   STUDY:  CT ANGIO CHEST W AND WO IV CONTRAST; 3/25/2024 9:47 am      INDICATION:  Signs/Symptoms:liver cyst and aortic dilatation.      COMPARISON:  None.      ACCESSION NUMBER(S):  [id number]      ORDERING CLINICIAN:  KEVIN BROWN      TECHNIQUE:  Using multi-detector CT technology, axial, sequential imaging with  prospective gating was performed of the chest following the  intravenous administration of contrast material.  A low-osmolar  contrast agent was used ( 80 ml of Optiray 350).      CT Dose-Length Product (DLP): 1371.2 mGy*cm  CT Dose Reduction Employed: Yes ( 100 kV, prospective ECG triggering,  iterative reconstruction)      For optimization of anatomic evaluation, multiplanar reconstruction,  maximum intensity projections, and advanced 3-D off-line  postprocessing were performed on a dedicated stand-alone workstation  by the interpreting physician.      FINDINGS:  Potential study limitations:  None.      THORACIC AORTA:  The thoracic aorta is upper limit of normal and measures 3.6 cm in  its maximal dimension at the level of main pulmonary artery. The  sinotubular junction is  preserved. There is no evidence for acute  aortic pathology, such as dissection, intramural hematoma, or  contained rupture. The arch vessel branching pattern is conventional.  All of the arch branch vessels appear widely patent in their proximal  portions. Visualized proximal abdominal aorta is normal.  The celiac trunk and SMA are normal in caliber.      REPRESENTATIVE MEASUREMENTS OF THE AORTA:  Annulus 2.7 x 2.2 cm  Root (Sinus of Valsalva) 3.4 x 3.3 x 3.2 cm  Sinotubular junction 3.3 x 3.1  cm  Mid ascending 3.66 cm  Distal ascending 3.4 cm  Mid transverse arch 2.6 cm  Isthmus 2.6 cm  Mid descending 2.5 cm  Distal descending (hiatus) 2.6 cm      CORONARY ARTERIES:  The examination  is not optimized for assessment of the coronary  arteries. Normal coronary artery origins.  Right dominant system.          PULMONARY ARTERIES:  The central pulmonary arteries appear normal (MPA-2.3 cm, RPA-2.1 cm,  LPA-1.8 cm).      SYSTEMIC AND PULMONARY VEINS:  Normal systemic venous and pulmonary venous return.  The SVC and IVC are of normal caliber.  Normal pulmonary venous anatomy.      CARDIAC CHAMBERS:  Normal atrioventricular and ventriculoarterial concordance.      LEFT ATRIUM:  Normal size (Area-28.54 cm2)      RIGHT ATRIUM:  Normal size (Area-21.82 cm2)      INTERATRIAL SEPTUM:  Intact.      LEFT VENTRICLE:  Normal size (RAMYA-3.9 cm)      RIGHT VENTRICLE:  Normal size (RAMYA-3.3 cm)      INTERVENTRICULAR SEPTUM:  Intact.      AORTIC VALVE:  The aortic valve is trileaflet in morphology. No calcifications.      MITRAL VALVE:  No thickening/calcification.      PERICARDIUM:  There is no pericardial effusion or thickening.        Impression    1. The ascending thoracic aorta is upper limit of normal caliber and  measures 3.6 cm in its maximum dimension.  2. No evidence of acute aortic pathology.      Reading Cardiologist: Dr. Alice Nevarez, date: 3/25/2024; 11:38 am      Extracardiac/extravascular findings will be reported separately by  the radiologist.      MACRO:  None      Signed by: Alice Nevarez 3/25/2024 12:12 PM  Dictation workstation:   [id number]       10:04 AM      Results were successfully communicated with the patient and they acknowledged their understanding.

## 2024-04-04 DIAGNOSIS — K76.89 LIVER CYST: Primary | ICD-10-CM

## 2024-04-08 DIAGNOSIS — E78.5 HYPERLIPIDEMIA, UNSPECIFIED HYPERLIPIDEMIA TYPE: ICD-10-CM

## 2024-04-08 DIAGNOSIS — I10 HYPERTENSION, BENIGN: ICD-10-CM

## 2024-04-08 DIAGNOSIS — F32.A DEPRESSION, UNSPECIFIED DEPRESSION TYPE: ICD-10-CM

## 2024-04-08 RX ORDER — SIMVASTATIN 20 MG/1
20 TABLET, FILM COATED ORAL DAILY
Qty: 90 TABLET | Refills: 0 | Status: SHIPPED | OUTPATIENT
Start: 2024-04-08

## 2024-04-08 RX ORDER — LISINOPRIL 20 MG/1
20 TABLET ORAL DAILY
Qty: 90 TABLET | Refills: 0 | Status: SHIPPED | OUTPATIENT
Start: 2024-04-08

## 2024-04-08 RX ORDER — CITALOPRAM 20 MG/1
20 TABLET, FILM COATED ORAL DAILY
Qty: 90 TABLET | Refills: 0 | Status: SHIPPED | OUTPATIENT
Start: 2024-04-08

## 2024-04-12 ENCOUNTER — TELEPHONE (OUTPATIENT)
Dept: CARDIOLOGY | Facility: CLINIC | Age: 59
End: 2024-04-12
Payer: COMMERCIAL

## 2024-04-12 ENCOUNTER — HOSPITAL ENCOUNTER (OUTPATIENT)
Dept: RADIOLOGY | Facility: CLINIC | Age: 59
Discharge: HOME | End: 2024-04-12
Payer: COMMERCIAL

## 2024-04-12 DIAGNOSIS — K76.89 LIVER CYST: ICD-10-CM

## 2024-04-12 PROCEDURE — 76705 ECHO EXAM OF ABDOMEN: CPT

## 2024-04-12 PROCEDURE — 76705 ECHO EXAM OF ABDOMEN: CPT | Performed by: RADIOLOGY

## 2024-04-12 NOTE — TELEPHONE ENCOUNTER
Result Communication    Resulted Orders   CT angio chest w and wo IV contrast    Addendum: 4/10/2024    Interpreted By:  Rian Matos,   ADDENDUM:  CHEST:  Trachea and central airways are patent. No endobronchial lesion.  Multiple 2-3 mm noncalcified nodules most likely granulomas.  There is no significant axillary or mediastinal lymph nodes. No hilar  adenopathy. Visualized thyroid is intact.  Esophagus is normal.      UPPER ABDOMEN:  Segment 6 hepatic cyst.      BONE AND SOFT TISSUE:  No suspicious osseous abnormality.      Reading Radiologist: Dr. Rian Matos, Date: 4/10/2024; 2:58 pm      Please refer to the below report for cardiovascular findings as  dictated by the cardiologist.      Signed by: Rian Matos 4/10/2024 3:02 PM      -------- ORIGINAL REPORT --------  Dictation workstation:   [id number]      Narrative    Interpreted By:  Alice Nevarez and Akula Navya   STUDY:  CT ANGIO CHEST W AND WO IV CONTRAST; 3/25/2024 9:47 am      INDICATION:  Signs/Symptoms:liver cyst and aortic dilatation.      COMPARISON:  None.      ACCESSION NUMBER(S):  [id number]      ORDERING CLINICIAN:  KEVIN BROWN      TECHNIQUE:  Using multi-detector CT technology, axial, sequential imaging with  prospective gating was performed of the chest following the  intravenous administration of contrast material.  A low-osmolar  contrast agent was used ( 80 ml of Optiray 350).      CT Dose-Length Product (DLP): 1371.2 mGy*cm  CT Dose Reduction Employed: Yes ( 100 kV, prospective ECG triggering,  iterative reconstruction)      For optimization of anatomic evaluation, multiplanar reconstruction,  maximum intensity projections, and advanced 3-D off-line  postprocessing were performed on a dedicated stand-alone workstation  by the interpreting physician.      FINDINGS:  Potential study limitations:  None.      THORACIC AORTA:  The thoracic aorta is upper limit of normal and measures 3.6 cm in  its maximal dimension at the  level of main pulmonary artery. The  sinotubular junction is  preserved. There is no evidence for acute  aortic pathology, such as dissection, intramural hematoma, or  contained rupture. The arch vessel branching pattern is conventional.  All of the arch branch vessels appear widely patent in their proximal  portions. Visualized proximal abdominal aorta is normal.  The celiac trunk and SMA are normal in caliber.      REPRESENTATIVE MEASUREMENTS OF THE AORTA:  Annulus 2.7 x 2.2 cm  Root (Sinus of Valsalva) 3.4 x 3.3 x 3.2 cm  Sinotubular junction 3.3 x 3.1  cm  Mid ascending 3.66 cm  Distal ascending 3.4 cm  Mid transverse arch 2.6 cm  Isthmus 2.6 cm  Mid descending 2.5 cm  Distal descending (hiatus) 2.6 cm      CORONARY ARTERIES:  The examination is not optimized for assessment of the coronary  arteries. Normal coronary artery origins.  Right dominant system.          PULMONARY ARTERIES:  The central pulmonary arteries appear normal (MPA-2.3 cm, RPA-2.1 cm,  LPA-1.8 cm).      SYSTEMIC AND PULMONARY VEINS:  Normal systemic venous and pulmonary venous return.  The SVC and IVC are of normal caliber.  Normal pulmonary venous anatomy.      CARDIAC CHAMBERS:  Normal atrioventricular and ventriculoarterial concordance.      LEFT ATRIUM:  Normal size (Area-28.54 cm2)      RIGHT ATRIUM:  Normal size (Area-21.82 cm2)      INTERATRIAL SEPTUM:  Intact.      LEFT VENTRICLE:  Normal size (RAMYA-3.9 cm)      RIGHT VENTRICLE:  Normal size (RAMYA-3.3 cm)      INTERVENTRICULAR SEPTUM:  Intact.      AORTIC VALVE:  The aortic valve is trileaflet in morphology. No calcifications.      MITRAL VALVE:  No thickening/calcification.      PERICARDIUM:  There is no pericardial effusion or thickening.        Impression    1. The ascending thoracic aorta is upper limit of normal caliber and  measures 3.6 cm in its maximum dimension.  2. No evidence of acute aortic pathology.      Reading Cardiologist: Dr. Alice Nevarez, date: 3/25/2024; 11:38 am       Extracardiac/extravascular findings will be reported separately by  the radiologist.      MACRO:  None      Signed by: Alice Nevarez 3/25/2024 12:12 PM  Dictation workstation:   [id number]       4:11 PM      Results were successfully communicated with the patient and they acknowledged their understanding.

## 2024-04-12 NOTE — TELEPHONE ENCOUNTER
----- Message from Ana Brizuela MD sent at 4/12/2024  3:04 PM EDT -----  Please let patient know CTA chest shows upper limit of normal size of aorta 3.6 cm. This doesn't really require us to follow with CT in the future. CT was also done for PCP to watch hepatic cyst and I defer to her to comment on non-cardiac. Thanks!

## 2024-06-30 DIAGNOSIS — I10 HYPERTENSION, BENIGN: ICD-10-CM

## 2024-06-30 DIAGNOSIS — F32.A DEPRESSION, UNSPECIFIED DEPRESSION TYPE: ICD-10-CM

## 2024-06-30 DIAGNOSIS — E78.5 HYPERLIPIDEMIA, UNSPECIFIED HYPERLIPIDEMIA TYPE: ICD-10-CM

## 2024-07-01 RX ORDER — CITALOPRAM 20 MG/1
20 TABLET, FILM COATED ORAL DAILY
Qty: 90 TABLET | Refills: 0 | Status: SHIPPED | OUTPATIENT
Start: 2024-07-01

## 2024-07-01 RX ORDER — LISINOPRIL 20 MG/1
20 TABLET ORAL DAILY
Qty: 90 TABLET | Refills: 0 | Status: SHIPPED | OUTPATIENT
Start: 2024-07-01

## 2024-07-01 RX ORDER — SIMVASTATIN 20 MG/1
20 TABLET, FILM COATED ORAL DAILY
Qty: 90 TABLET | Refills: 0 | Status: SHIPPED | OUTPATIENT
Start: 2024-07-01

## 2024-09-06 ENCOUNTER — APPOINTMENT (OUTPATIENT)
Dept: PRIMARY CARE | Facility: CLINIC | Age: 59
End: 2024-09-06
Payer: COMMERCIAL

## 2024-09-06 VITALS
SYSTOLIC BLOOD PRESSURE: 124 MMHG | WEIGHT: 250 LBS | RESPIRATION RATE: 16 BRPM | OXYGEN SATURATION: 95 % | HEIGHT: 68 IN | HEART RATE: 66 BPM | BODY MASS INDEX: 37.89 KG/M2 | TEMPERATURE: 98.3 F | DIASTOLIC BLOOD PRESSURE: 78 MMHG

## 2024-09-06 DIAGNOSIS — F32.A DEPRESSION, UNSPECIFIED DEPRESSION TYPE: ICD-10-CM

## 2024-09-06 DIAGNOSIS — Z12.5 SCREENING FOR PROSTATE CANCER: ICD-10-CM

## 2024-09-06 DIAGNOSIS — Z00.00 ROUTINE HEALTH MAINTENANCE: ICD-10-CM

## 2024-09-06 DIAGNOSIS — E78.5 HYPERLIPIDEMIA, UNSPECIFIED HYPERLIPIDEMIA TYPE: ICD-10-CM

## 2024-09-06 DIAGNOSIS — I10 HYPERTENSION, BENIGN: Primary | ICD-10-CM

## 2024-09-06 DIAGNOSIS — Z23 NEED FOR INFLUENZA VACCINATION: ICD-10-CM

## 2024-09-06 DIAGNOSIS — R73.09 ELEVATED GLUCOSE: ICD-10-CM

## 2024-09-06 PROBLEM — K76.89 LIVER CYST: Status: ACTIVE | Noted: 2024-09-06

## 2024-09-06 PROCEDURE — 90656 IIV3 VACC NO PRSV 0.5 ML IM: CPT | Performed by: INTERNAL MEDICINE

## 2024-09-06 PROCEDURE — 90471 IMMUNIZATION ADMIN: CPT | Performed by: INTERNAL MEDICINE

## 2024-09-06 PROCEDURE — 3008F BODY MASS INDEX DOCD: CPT | Performed by: INTERNAL MEDICINE

## 2024-09-06 PROCEDURE — 3078F DIAST BP <80 MM HG: CPT | Performed by: INTERNAL MEDICINE

## 2024-09-06 PROCEDURE — 1036F TOBACCO NON-USER: CPT | Performed by: INTERNAL MEDICINE

## 2024-09-06 PROCEDURE — 3074F SYST BP LT 130 MM HG: CPT | Performed by: INTERNAL MEDICINE

## 2024-09-06 PROCEDURE — 99213 OFFICE O/P EST LOW 20 MIN: CPT | Performed by: INTERNAL MEDICINE

## 2024-09-06 RX ORDER — HYDROCHLOROTHIAZIDE 25 MG/1
25 TABLET ORAL DAILY
Qty: 90 TABLET | Refills: 3 | Status: SHIPPED | OUTPATIENT
Start: 2024-09-06 | End: 2025-09-06

## 2024-09-06 RX ORDER — LISINOPRIL 20 MG/1
20 TABLET ORAL DAILY
Qty: 90 TABLET | Refills: 3 | Status: SHIPPED | OUTPATIENT
Start: 2024-09-06

## 2024-09-06 RX ORDER — SIMVASTATIN 20 MG/1
20 TABLET, FILM COATED ORAL DAILY
Qty: 90 TABLET | Refills: 3 | Status: SHIPPED | OUTPATIENT
Start: 2024-09-06

## 2024-09-06 RX ORDER — CITALOPRAM 20 MG/1
20 TABLET, FILM COATED ORAL DAILY
Qty: 90 TABLET | Refills: 3 | Status: SHIPPED | OUTPATIENT
Start: 2024-09-06

## 2024-09-06 ASSESSMENT — ENCOUNTER SYMPTOMS
EYE PAIN: 0
POLYPHAGIA: 0
CHEST TIGHTNESS: 0
UNEXPECTED WEIGHT CHANGE: 0
HEADACHES: 0
COUGH: 0
DIARRHEA: 0
DIZZINESS: 0
DYSURIA: 0
HEMATURIA: 0
MYALGIAS: 0
SHORTNESS OF BREATH: 0
CONSTIPATION: 0
WOUND: 0
CHILLS: 0
RHINORRHEA: 0
POLYDIPSIA: 0
BLOOD IN STOOL: 0
VOMITING: 0
NAUSEA: 0
PALPITATIONS: 0
SORE THROAT: 0
NERVOUS/ANXIOUS: 0
ABDOMINAL PAIN: 0
FREQUENCY: 0
WHEEZING: 0
ARTHRALGIAS: 0
DYSPHORIC MOOD: 0
FEVER: 0

## 2024-09-06 ASSESSMENT — PATIENT HEALTH QUESTIONNAIRE - PHQ9
2. FEELING DOWN, DEPRESSED OR HOPELESS: NOT AT ALL
SUM OF ALL RESPONSES TO PHQ9 QUESTIONS 1 AND 2: 0
1. LITTLE INTEREST OR PLEASURE IN DOING THINGS: NOT AT ALL

## 2024-09-06 NOTE — PROGRESS NOTES
"Subjective   Patient ID: Venkatesh Almodovar is a 59 y.o. male who presents for Follow-up.    HPI     Here for 6 month follow-up  Doing well  BP doing well  Have liver ultrasound  Feels good  States wants to lose weight  His issue is not exercising  Does like pickleball    Review of Systems   Constitutional:  Negative for chills, fever and unexpected weight change.   HENT:  Negative for congestion, hearing loss, rhinorrhea and sore throat.    Eyes:  Negative for pain and visual disturbance.   Respiratory:  Negative for cough, chest tightness, shortness of breath and wheezing.    Cardiovascular:  Negative for chest pain and palpitations.   Gastrointestinal:  Negative for abdominal pain, blood in stool, constipation, diarrhea, nausea and vomiting.   Endocrine: Negative for cold intolerance, heat intolerance, polydipsia and polyphagia.   Genitourinary:  Negative for dysuria, frequency and hematuria.   Musculoskeletal:  Negative for arthralgias and myalgias.   Skin:  Negative for rash and wound.   Neurological:  Negative for dizziness, syncope and headaches.   Psychiatric/Behavioral:  Negative for dysphoric mood. The patient is not nervous/anxious.        Objective   /78 (BP Location: Left arm, Patient Position: Sitting, BP Cuff Size: Large adult)   Pulse 66   Temp 36.8 °C (98.3 °F)   Resp 16   Ht 1.727 m (5' 8\")   Wt 113 kg (250 lb)   SpO2 95%   BMI 38.01 kg/m²     Physical Exam  Constitutional:       Appearance: Normal appearance.   Cardiovascular:      Rate and Rhythm: Normal rate and regular rhythm.      Heart sounds: Normal heart sounds. No murmur heard.     No gallop.   Pulmonary:      Effort: Pulmonary effort is normal. No respiratory distress.      Breath sounds: Normal breath sounds.   Musculoskeletal:      Right lower leg: No edema.      Left lower leg: No edema.   Neurological:      Mental Status: He is alert.         Assessment/Plan   Problem List Items Addressed This Visit             ICD-10-CM    " Hypertension, benign - Primary I10    Relevant Medications    hydroCHLOROthiazide (HYDRODiuril) 25 mg tablet    lisinopril 20 mg tablet    Depression F32.A    Relevant Medications    citalopram (CeleXA) 20 mg tablet    Hyperlipidemia E78.5    Relevant Medications    simvastatin (Zocor) 20 mg tablet    Other Relevant Orders    Lipid Panel     Other Visit Diagnoses         Codes    Need for influenza vaccination     Z23    Relevant Orders    Flu vaccine, trivalent, preservative free, age 6 months and greater (Fluraix/Fluzone/Flulaval) (Completed)    Routine health maintenance     Z00.00    Relevant Orders    CBC    Comprehensive Metabolic Panel    TSH with reflex to Free T4 if abnormal    Screening for prostate cancer     Z12.5    Relevant Orders    Prostate Specific Antigen    Elevated glucose     R73.09    Relevant Orders    Hemoglobin A1c          We discussed joining [employer] to stay active    Aortic dilation: seeing Dr. Brizuela     Liver cyst: follow 1 year--due 4/25     Hypertension: controlled. Controlled. Compliant with medications. Tolerating medications without issues. Continue with current plan. Continue lisinopril and hctz     Anxiety: on celexa. Controlled. Compliant with medications. Tolerating medications without issues. Continue with current plan.  -never tried to wean  -discussed trying to wean. Will think about it. Not sure     Hyperlipidemia: on simvastatin. Controlled. Compliant with medications. Tolerating medications without issues. Continue with current plan.  calcium score: 2 (2/21)     Right knee pain with slight effusion: 4/19, saw ortho and sent to PT     Check labs, CPE 1 year. Followup 6 months     Health Maintenance  -Prostate Cancer Screening: 3/24 WNL  -Colonoscopy: 3/2021- 10 years  -Vaccinations: Tdap 12/29/20. UTD with shingrix (in [place]-will try and get) , covid, and flu

## 2024-12-30 DIAGNOSIS — I10 HYPERTENSION, BENIGN: ICD-10-CM

## 2024-12-30 DIAGNOSIS — F32.A DEPRESSION, UNSPECIFIED DEPRESSION TYPE: ICD-10-CM

## 2024-12-30 DIAGNOSIS — E78.5 HYPERLIPIDEMIA, UNSPECIFIED HYPERLIPIDEMIA TYPE: ICD-10-CM

## 2024-12-30 RX ORDER — LISINOPRIL 20 MG/1
20 TABLET ORAL DAILY
Qty: 90 TABLET | Refills: 3 | Status: SHIPPED | OUTPATIENT
Start: 2024-12-30

## 2024-12-30 RX ORDER — SIMVASTATIN 20 MG/1
20 TABLET, FILM COATED ORAL DAILY
Qty: 90 TABLET | Refills: 3 | Status: SHIPPED | OUTPATIENT
Start: 2024-12-30

## 2024-12-30 RX ORDER — CITALOPRAM 20 MG/1
20 TABLET, FILM COATED ORAL DAILY
Qty: 90 TABLET | Refills: 3 | Status: SHIPPED | OUTPATIENT
Start: 2024-12-30

## 2025-03-07 ENCOUNTER — APPOINTMENT (OUTPATIENT)
Dept: PRIMARY CARE | Facility: CLINIC | Age: 60
End: 2025-03-07
Payer: COMMERCIAL

## 2025-03-11 ENCOUNTER — APPOINTMENT (OUTPATIENT)
Dept: CARDIOLOGY | Facility: CLINIC | Age: 60
End: 2025-03-11
Payer: COMMERCIAL

## 2025-04-16 ENCOUNTER — APPOINTMENT (OUTPATIENT)
Dept: PRIMARY CARE | Facility: CLINIC | Age: 60
End: 2025-04-16
Payer: COMMERCIAL

## 2025-04-16 VITALS
HEART RATE: 84 BPM | RESPIRATION RATE: 14 BRPM | TEMPERATURE: 98.1 F | DIASTOLIC BLOOD PRESSURE: 80 MMHG | OXYGEN SATURATION: 99 % | HEIGHT: 68 IN | WEIGHT: 236 LBS | SYSTOLIC BLOOD PRESSURE: 120 MMHG | BODY MASS INDEX: 35.77 KG/M2

## 2025-04-16 DIAGNOSIS — R21 SKIN RASH: Primary | ICD-10-CM

## 2025-04-16 DIAGNOSIS — I77.819 AORTIC DILATATION (CMS-HCC): ICD-10-CM

## 2025-04-16 DIAGNOSIS — I10 HYPERTENSION, BENIGN: ICD-10-CM

## 2025-04-16 DIAGNOSIS — E78.5 HYPERLIPIDEMIA, UNSPECIFIED HYPERLIPIDEMIA TYPE: ICD-10-CM

## 2025-04-16 DIAGNOSIS — F32.A DEPRESSION, UNSPECIFIED DEPRESSION TYPE: ICD-10-CM

## 2025-04-16 DIAGNOSIS — M25.561 RIGHT KNEE PAIN, UNSPECIFIED CHRONICITY: ICD-10-CM

## 2025-04-16 PROCEDURE — 3079F DIAST BP 80-89 MM HG: CPT | Performed by: STUDENT IN AN ORGANIZED HEALTH CARE EDUCATION/TRAINING PROGRAM

## 2025-04-16 PROCEDURE — 1036F TOBACCO NON-USER: CPT | Performed by: STUDENT IN AN ORGANIZED HEALTH CARE EDUCATION/TRAINING PROGRAM

## 2025-04-16 PROCEDURE — 99396 PREV VISIT EST AGE 40-64: CPT | Performed by: STUDENT IN AN ORGANIZED HEALTH CARE EDUCATION/TRAINING PROGRAM

## 2025-04-16 PROCEDURE — 3074F SYST BP LT 130 MM HG: CPT | Performed by: STUDENT IN AN ORGANIZED HEALTH CARE EDUCATION/TRAINING PROGRAM

## 2025-04-16 PROCEDURE — 3008F BODY MASS INDEX DOCD: CPT | Performed by: STUDENT IN AN ORGANIZED HEALTH CARE EDUCATION/TRAINING PROGRAM

## 2025-04-16 ASSESSMENT — ENCOUNTER SYMPTOMS
DIARRHEA: 0
PALPITATIONS: 0
UNEXPECTED WEIGHT CHANGE: 0
FEVER: 0
DIAPHORESIS: 0
DIZZINESS: 0
DYSURIA: 0
MYALGIAS: 0
NAUSEA: 0
VOMITING: 0
SHORTNESS OF BREATH: 0
ABDOMINAL PAIN: 0
CHILLS: 0
LIGHT-HEADEDNESS: 0

## 2025-04-16 ASSESSMENT — PATIENT HEALTH QUESTIONNAIRE - PHQ9
1. LITTLE INTEREST OR PLEASURE IN DOING THINGS: NOT AT ALL
2. FEELING DOWN, DEPRESSED OR HOPELESS: NOT AT ALL
SUM OF ALL RESPONSES TO PHQ9 QUESTIONS 1 AND 2: 0

## 2025-04-16 NOTE — PROGRESS NOTES
"Subjective   Patient ID: Venkatesh Almodovar is a 60 y.o. male who presents for No chief complaint on file..  HPI  Here for CPE.  Doing well.  No acute concerns today.  Some intermittently right knee sx with certain movements. Not ready to see ortho yet as it is still better and not major issue.   Seeing outside clinic for semaglutide.   Has dry flaky rash on palm of right hand for last year. Just started lotion in last week. Uses hydrogen peroxide toothpaste.      Review of Systems   Constitutional:  Negative for chills, diaphoresis, fever and unexpected weight change.   HENT:  Negative for hearing loss.    Eyes:  Negative for visual disturbance.   Respiratory:  Negative for shortness of breath.    Cardiovascular:  Negative for chest pain, palpitations and leg swelling.   Gastrointestinal:  Negative for abdominal pain, diarrhea, nausea and vomiting.   Endocrine: Negative for cold intolerance and heat intolerance.   Genitourinary:  Negative for dysuria, scrotal swelling and testicular pain.   Musculoskeletal:  Negative for myalgias.   Skin:  Negative for rash.   Neurological:  Negative for dizziness, syncope and light-headedness.       /80   Pulse 84   Temp 36.7 °C (98.1 °F)   Resp 14   Ht 1.727 m (5' 8\")   Wt 107 kg (236 lb)   SpO2 99%   BMI 35.88 kg/m²     Objective   Physical Exam  Vitals reviewed.   Constitutional:       General: He is not in acute distress.     Appearance: Normal appearance.   HENT:      Head: Normocephalic.      Right Ear: Tympanic membrane, ear canal and external ear normal. There is no impacted cerumen.      Left Ear: Tympanic membrane, ear canal and external ear normal. There is no impacted cerumen.      Mouth/Throat:      Mouth: Mucous membranes are moist.      Pharynx: Oropharynx is clear. No oropharyngeal exudate or posterior oropharyngeal erythema.   Cardiovascular:      Rate and Rhythm: Normal rate and regular rhythm.   Pulmonary:      Effort: Pulmonary effort is normal. No " respiratory distress.      Breath sounds: Normal breath sounds.   Abdominal:      General: Bowel sounds are normal. There is no distension.      Palpations: Abdomen is soft. There is no mass.      Tenderness: There is no abdominal tenderness. There is no guarding or rebound.   Musculoskeletal:         General: No deformity.      Cervical back: Neck supple. No tenderness.   Lymphadenopathy:      Cervical: No cervical adenopathy.   Skin:     Coloration: Skin is not jaundiced.      Comments: Left palmar flaking region about 2cm in diameter, no erythema or swelling present.    Neurological:      Mental Status: He is alert.         Assessment/Plan   Problem List Items Addressed This Visit       Hypertension, benign    Aortic dilatation (CMS-HCC)    Depression    Hyperlipidemia    Right knee pain    Skin rash - Primary    Relevant Orders    Referral to Dermatology        CPE completed.  Advised to keep a heart healthy, low fat diet with fruits and veggies like Mediterranean diet.  Advised on the importance of exercise and maintaining 150 minutes of exercise per week (30 minutes per day 5 days a week).  Advised on regular eye and dental visits.  Discussed age appropriate cancer screening, immunizations and recommendations given.  Discussed avoiding illicit drugs and tobacco. Advised on appropriate use of alcohol.  Advised to wear seat belt.     Pickleball league to stay active.    Aortic dilation: seeing Dr. Brizuela     Liver cyst: follow 1 year around this time--he will call if does not get scan with Dr. Brizuela where liver lesion is usually seen. If not done this year, check ultrasound     Hypertension: Controlled. Compliant with medications. Tolerating medications without issues. Continue with current plan. Continue lisinopril and hydrochlorothiazide. Discussed needs labs done soon.      Anxiety: on celexa. Controlled. Compliant with medications. Tolerating medications without issues. Continue with current plan.      Hyperlipidemia: on simvastatin. Controlled but due for labs. Compliant with medications. Tolerating medications without issues. Continue with current plan.  calcium score: 2 (2/21)     Right knee pain with slight effusion: 4/19, saw ortho and sent to PT. Intermittently sore from time to time. Defers management for this at this time.     Skin rash-Will try moisturizers and if not beter see derm, referred today. He will try using a glove for toothpaste as that toothpaste has hydrogen peroxide.      Advised he is due for fasting labs, CPE 1 year. Followup 6 months     Health Maintenance  -Prostate Cancer Screening: 3/24 WNL, recheck ordered  -Colonoscopy: 3/2021- 10 years  -Vaccinations: Tdap 12/29/20. UTD with shingrix (in [place]-will try and get) , covid, and flu (after labor day) advised, prevnar advised.

## 2025-04-22 ENCOUNTER — OFFICE VISIT (OUTPATIENT)
Dept: CARDIOLOGY | Facility: CLINIC | Age: 60
End: 2025-04-22
Payer: COMMERCIAL

## 2025-04-22 VITALS
WEIGHT: 239 LBS | HEIGHT: 68 IN | BODY MASS INDEX: 36.22 KG/M2 | SYSTOLIC BLOOD PRESSURE: 129 MMHG | HEART RATE: 73 BPM | DIASTOLIC BLOOD PRESSURE: 88 MMHG | OXYGEN SATURATION: 95 % | RESPIRATION RATE: 18 BRPM

## 2025-04-22 DIAGNOSIS — E78.49 OTHER HYPERLIPIDEMIA: Primary | ICD-10-CM

## 2025-04-22 DIAGNOSIS — I77.819 AORTIC DILATATION (CMS-HCC): ICD-10-CM

## 2025-04-22 DIAGNOSIS — I10 HYPERTENSION, BENIGN: ICD-10-CM

## 2025-04-22 LAB
ALBUMIN SERPL-MCNC: 4.1 G/DL (ref 3.6–5.1)
ALP SERPL-CCNC: 43 U/L (ref 35–144)
ALT SERPL-CCNC: 27 U/L (ref 9–46)
ANION GAP SERPL CALCULATED.4IONS-SCNC: 6 MMOL/L (CALC) (ref 7–17)
AST SERPL-CCNC: 25 U/L (ref 10–35)
BILIRUB SERPL-MCNC: 0.5 MG/DL (ref 0.2–1.2)
BUN SERPL-MCNC: 20 MG/DL (ref 7–25)
CALCIUM SERPL-MCNC: 9.7 MG/DL (ref 8.6–10.3)
CHLORIDE SERPL-SCNC: 107 MMOL/L (ref 98–110)
CHOLEST SERPL-MCNC: 139 MG/DL
CHOLEST/HDLC SERPL: 2.7 (CALC)
CO2 SERPL-SCNC: 29 MMOL/L (ref 20–32)
CREAT SERPL-MCNC: 0.96 MG/DL (ref 0.7–1.35)
EGFRCR SERPLBLD CKD-EPI 2021: 90 ML/MIN/1.73M2
ERYTHROCYTE [DISTWIDTH] IN BLOOD BY AUTOMATED COUNT: 12.7 % (ref 11–15)
EST. AVERAGE GLUCOSE BLD GHB EST-MCNC: 108 MG/DL
EST. AVERAGE GLUCOSE BLD GHB EST-SCNC: 6 MMOL/L
GLUCOSE SERPL-MCNC: 94 MG/DL (ref 65–99)
HBA1C MFR BLD: 5.4 %
HCT VFR BLD AUTO: 46.1 % (ref 38.5–50)
HDLC SERPL-MCNC: 52 MG/DL
HGB BLD-MCNC: 15.5 G/DL (ref 13.2–17.1)
LDLC SERPL CALC-MCNC: 69 MG/DL (CALC)
MCH RBC QN AUTO: 30.9 PG (ref 27–33)
MCHC RBC AUTO-ENTMCNC: 33.6 G/DL (ref 32–36)
MCV RBC AUTO: 91.8 FL (ref 80–100)
NONHDLC SERPL-MCNC: 87 MG/DL (CALC)
PLATELET # BLD AUTO: 307 THOUSAND/UL (ref 140–400)
PMV BLD REES-ECKER: 10.1 FL (ref 7.5–12.5)
POTASSIUM SERPL-SCNC: 4 MMOL/L (ref 3.5–5.3)
PROT SERPL-MCNC: 6.8 G/DL (ref 6.1–8.1)
PSA SERPL-MCNC: 3.17 NG/ML
RBC # BLD AUTO: 5.02 MILLION/UL (ref 4.2–5.8)
SODIUM SERPL-SCNC: 142 MMOL/L (ref 135–146)
TRIGL SERPL-MCNC: 93 MG/DL
TSH SERPL-ACNC: 2.43 MIU/L (ref 0.4–4.5)
WBC # BLD AUTO: 7.1 THOUSAND/UL (ref 3.8–10.8)

## 2025-04-22 PROCEDURE — 3079F DIAST BP 80-89 MM HG: CPT | Performed by: INTERNAL MEDICINE

## 2025-04-22 PROCEDURE — 3074F SYST BP LT 130 MM HG: CPT | Performed by: INTERNAL MEDICINE

## 2025-04-22 PROCEDURE — 1036F TOBACCO NON-USER: CPT | Performed by: INTERNAL MEDICINE

## 2025-04-22 PROCEDURE — 93005 ELECTROCARDIOGRAM TRACING: CPT | Performed by: INTERNAL MEDICINE

## 2025-04-22 PROCEDURE — 3008F BODY MASS INDEX DOCD: CPT | Performed by: INTERNAL MEDICINE

## 2025-04-22 PROCEDURE — 99214 OFFICE O/P EST MOD 30 MIN: CPT | Performed by: INTERNAL MEDICINE

## 2025-04-22 NOTE — PROGRESS NOTES
Cardiology Office Note    REASON FOR VISIT:  routine follow up    PCP (requesting provider): Katty Randle MD.    HPI:  Venkatesh Almodovar is a 60 y.o. male with hx as listed below who returns to Cardiology clinic for follow up visit; last seen in 3/2024.     Patient has been doing very well lately. He has been exercising more since the weather has improved. He started semaglutide last fall and has had 11 lb weight loss. He is hoping to lose more and feels like the medication is still working for him to reduce his appetite. Denies any chest pain, SOB, palpitations, light-headedness, dizziness. No complaints today.    BP mildly elevated at 141/90 on first reading. Repeat 129/88.    PAST MEDICAL/SURGICAL HISTORY  -HTN  -HLD  -Ascending aorta 3.6 cm    Surgical History[1]     PRIOR CARDIAC TESTING  EKG: NSR    Echocardiogram: EF 55%, mild dilation of aorta, 3.6 cm ()    Stress Testing: No results found for this or any previous visit from the past 1825 days.    Cardiac Catheterization: No results found for this or any previous visit from the past 1825 days.  No results found for this or any previous visit from the past 3650 days.     Cardiac Scorin ()    AAA : No results found for this or any previous visit from the past 1825 days.    OTHER: No results found for this or any previous visit from the past 1825 days.      REVIEW OF SYSTEMS   Negative except for HPI    FAMILY HISTORY  Family History[2]    SOCIAL HISTORY  Social History[3]    VITALS  Vitals:    25 0859   BP: 129/88   Pulse:    Resp:    SpO2:       Body mass index is 36.34 kg/m².    PHYSICAL EXAM   Constitutional: alert and in no acute distress.   Neck: neck is supple, symmetric, trachea midline, no masses  and no thyromegaly .   Pulmonary: no increased work of breathing or signs of respiratory distress  and lungs clear to auscultation.    Cardiovascular: JVP was normal, no thrills , regular rhythm, normal S1 and S2, no murmurs, no peripheral  "edemea  Abdomen: abdomen non-tender, no masses  and no hepatomegaly .   Skin: skin warm and dry  Psychiatric judgment and insight is normal , oriented to person, place and time  and normal mood and affect .    LABS  Recent Labs     04/21/25  0715 03/01/24 0924 02/24/23  0913 02/21/22  1114 12/29/20  0922   WBC 7.1 7.9 9.8 8.9 8.8   HGB 15.5 16.3 16.4 16.3 16.2   HCT 46.1 47.3 47.3 49.6 47.4    323 334 363 332   MCV 91.8 91 91 91 91     No results for input(s): \"PTT\", \"INR\", \"HAUF\", \"DDIMERVTE\", \"HAPTOGLOBIN\", \"FIBRINOGEN\" in the last 71581 hours.  Recent Labs     04/21/25  0715 03/01/24  0924 02/24/23  0913 02/21/22  1114 01/31/22  0940 12/29/20  0922    143 139 138 138 142   K 4.0 4.1 3.8 4.1 4.0 3.9    102 100 99 101 101   CO2 29 31 29 34* 29 30   ANIONGAP 6* 14 14 9* 12 15   BUN 20 18 27* 20 16 20   CREATININE 0.96 1.02 1.04 1.05 0.91 0.93   EGFR 90 84  --   --   --   --    ALBUMIN 4.1 4.4 4.6 4.3 4.2 4.1   ALKPHOS 43 40 44 40  --  44   ALT 27 23 28 24  --  31   AST 25 22 29 26  --  30   BILITOT 0.5 0.7 1.1 0.8  --  0.8     Recent Labs     04/21/25  0715 06/02/23  0927   TSH 2.43 2.26      No results for input(s): \"LDH\", \"CKMB\", \"TROPHS\", \"BNP\" in the last 99509 hours.    No lab exists for component: \"CK\", \"CKMBP\"  Recent Labs     04/21/25  0715 03/01/24 0924 02/24/23  0913 02/21/22  1114 12/29/20  0922   CHOL 139 168 167 184 184   LDLF  --   --  91 97 109*   HDL 52 52.1 46.8 58.0 52.0   TRIG 93 121 144 145 114   HGBA1C 5.4  --   --   --   --        ALLERGIES  Allergies[4]    MEDICATIONS  Current Outpatient Medications   Medication Instructions    aspirin 81 mg    citalopram (CELEXA) 20 mg, oral, Daily    hydroCHLOROthiazide (HYDRODIURIL) 25 mg, oral, Daily    lisinopril 20 mg, oral, Daily    semaglutide 0.5 mg, Every 7 days    simvastatin (ZOCOR) 20 mg, oral, Daily       ASSESSMENT/PLAN  Venkatesh Almodovar is a 60 y.o. male with a past medical history of HTN, HLD presenting as one year follow " up. HLD well controlled and ascending aorta dilation does not need further surveillance. He does not further follow up with cardiology at this time unless new concerns arise.    #HLD  -Last LDL 69  -c/w simvastatin  -c/w aspirin 81 (personal preference)    #Mild Ascending Aorta  ::3.6 cm  -No need for further surveillance      #Modifiable CV Risk Factor Summary  - BP: at goal  - LDL: at goal  - Diabetes: Most recent A1c 5.4 (4/21/25)  - Smoking: non-smoker  - Obesity: body mass index is 36.34 kg/m².       Follow-up as needed    Patient was seen and examined with Dr. Gelacio Pizarro MD  Internal Medicine, PGY-2  [facility] / [facility] [place]          [1]   Past Surgical History:  Procedure Laterality Date    CIRCUMCISION, PRIMARY      OTHER SURGICAL HISTORY  04/15/2019    No history of surgery    VASECTOMY     [2]   Family History  Problem Relation Name Age of Onset    Breast cancer Mother Miki Almodovar     Cancer Mother Miki Almodovar     Hypertension Father Leonel Almodovar    [3]   Social History  Tobacco Use    Smoking status: Never     Passive exposure: Never    Smokeless tobacco: Never   Substance Use Topics    Alcohol use: Yes     Alcohol/week: 11.0 standard drinks of alcohol     Types: 2 Glasses of wine, 5 Cans of beer, 4 Shots of liquor per week    Drug use: Never   [4] No Known Allergies

## 2025-04-24 LAB
ATRIAL RATE: 74 BPM
P AXIS: 45 DEGREES
P OFFSET: 182 MS
P ONSET: 137 MS
PR INTERVAL: 164 MS
Q ONSET: 219 MS
QRS COUNT: 13 BEATS
QRS DURATION: 84 MS
QT INTERVAL: 390 MS
QTC CALCULATION(BAZETT): 432 MS
QTC FREDERICIA: 418 MS
R AXIS: 6 DEGREES
T AXIS: 34 DEGREES
T OFFSET: 414 MS
VENTRICULAR RATE: 74 BPM

## 2025-04-25 ENCOUNTER — PATIENT MESSAGE (OUTPATIENT)
Dept: PRIMARY CARE | Facility: CLINIC | Age: 60
End: 2025-04-25
Payer: COMMERCIAL

## 2025-04-25 DIAGNOSIS — K76.89 LIVER CYST: Primary | ICD-10-CM

## 2025-05-01 ENCOUNTER — APPOINTMENT (OUTPATIENT)
Dept: RADIOLOGY | Facility: CLINIC | Age: 60
End: 2025-05-01
Payer: COMMERCIAL

## 2025-05-01 DIAGNOSIS — K76.89 LIVER CYST: ICD-10-CM

## 2025-05-01 PROCEDURE — 76705 ECHO EXAM OF ABDOMEN: CPT

## 2025-10-16 ENCOUNTER — APPOINTMENT (OUTPATIENT)
Dept: PRIMARY CARE | Facility: CLINIC | Age: 60
End: 2025-10-16
Payer: COMMERCIAL

## 2026-04-17 ENCOUNTER — APPOINTMENT (OUTPATIENT)
Dept: PRIMARY CARE | Facility: CLINIC | Age: 61
End: 2026-04-17
Payer: COMMERCIAL